# Patient Record
Sex: MALE | Race: BLACK OR AFRICAN AMERICAN | URBAN - METROPOLITAN AREA
[De-identification: names, ages, dates, MRNs, and addresses within clinical notes are randomized per-mention and may not be internally consistent; named-entity substitution may affect disease eponyms.]

---

## 2023-06-08 ENCOUNTER — OFFICE VISIT (OUTPATIENT)
Age: 78
End: 2023-06-08
Payer: MEDICARE

## 2023-06-08 VITALS
HEART RATE: 61 BPM | RESPIRATION RATE: 16 BRPM | TEMPERATURE: 98.4 F | SYSTOLIC BLOOD PRESSURE: 144 MMHG | BODY MASS INDEX: 27.09 KG/M2 | WEIGHT: 200 LBS | HEIGHT: 72 IN | OXYGEN SATURATION: 95 % | DIASTOLIC BLOOD PRESSURE: 84 MMHG

## 2023-06-08 DIAGNOSIS — I10 PRIMARY HYPERTENSION: Primary | ICD-10-CM

## 2023-06-08 DIAGNOSIS — N39.41 URGE INCONTINENCE OF URINE: ICD-10-CM

## 2023-06-08 DIAGNOSIS — G30.9 ALZHEIMER'S DEMENTIA, UNSPECIFIED DEMENTIA SEVERITY, UNSPECIFIED TIMING OF DEMENTIA ONSET, UNSPECIFIED WHETHER BEHAVIORAL, PSYCHOTIC, OR MOOD DISTURBANCE OR ANXIETY (HCC): ICD-10-CM

## 2023-06-08 DIAGNOSIS — F02.80 ALZHEIMER'S DEMENTIA, UNSPECIFIED DEMENTIA SEVERITY, UNSPECIFIED TIMING OF DEMENTIA ONSET, UNSPECIFIED WHETHER BEHAVIORAL, PSYCHOTIC, OR MOOD DISTURBANCE OR ANXIETY (HCC): ICD-10-CM

## 2023-06-08 PROCEDURE — 99213 OFFICE O/P EST LOW 20 MIN: CPT | Performed by: STUDENT IN AN ORGANIZED HEALTH CARE EDUCATION/TRAINING PROGRAM

## 2023-06-08 PROCEDURE — 3077F SYST BP >= 140 MM HG: CPT | Performed by: STUDENT IN AN ORGANIZED HEALTH CARE EDUCATION/TRAINING PROGRAM

## 2023-06-08 PROCEDURE — 1123F ACP DISCUSS/DSCN MKR DOCD: CPT | Performed by: STUDENT IN AN ORGANIZED HEALTH CARE EDUCATION/TRAINING PROGRAM

## 2023-06-08 PROCEDURE — 3078F DIAST BP <80 MM HG: CPT | Performed by: STUDENT IN AN ORGANIZED HEALTH CARE EDUCATION/TRAINING PROGRAM

## 2023-06-08 RX ORDER — LISINOPRIL 40 MG/1
40 TABLET ORAL
COMMUNITY
Start: 2022-11-07

## 2023-06-08 RX ORDER — UREA 200 MG/G
GEL TOPICAL
COMMUNITY
Start: 2023-05-08

## 2023-06-08 SDOH — ECONOMIC STABILITY: HOUSING INSECURITY: IN THE LAST 12 MONTHS, HOW MANY PLACES HAVE YOU LIVED?: 0

## 2023-06-08 SDOH — ECONOMIC STABILITY: HOUSING INSECURITY
IN THE LAST 12 MONTHS, WAS THERE A TIME WHEN YOU DID NOT HAVE A STEADY PLACE TO SLEEP OR SLEPT IN A SHELTER (INCLUDING NOW)?: NO

## 2023-06-08 SDOH — ECONOMIC STABILITY: FOOD INSECURITY: WITHIN THE PAST 12 MONTHS, YOU WORRIED THAT YOUR FOOD WOULD RUN OUT BEFORE YOU GOT MONEY TO BUY MORE.: NEVER TRUE

## 2023-06-08 SDOH — HEALTH STABILITY: PHYSICAL HEALTH: ON AVERAGE, HOW MANY DAYS PER WEEK DO YOU ENGAGE IN MODERATE TO STRENUOUS EXERCISE (LIKE A BRISK WALK)?: 3 DAYS

## 2023-06-08 SDOH — ECONOMIC STABILITY: INCOME INSECURITY: IN THE LAST 12 MONTHS, WAS THERE A TIME WHEN YOU WERE NOT ABLE TO PAY THE MORTGAGE OR RENT ON TIME?: NO

## 2023-06-08 SDOH — ECONOMIC STABILITY: TRANSPORTATION INSECURITY
IN THE PAST 12 MONTHS, HAS LACK OF TRANSPORTATION KEPT YOU FROM MEETINGS, WORK, OR FROM GETTING THINGS NEEDED FOR DAILY LIVING?: NO

## 2023-06-08 SDOH — HEALTH STABILITY: PHYSICAL HEALTH: ON AVERAGE, HOW MANY MINUTES DO YOU ENGAGE IN EXERCISE AT THIS LEVEL?: 10 MIN

## 2023-06-08 SDOH — ECONOMIC STABILITY: FOOD INSECURITY: WITHIN THE PAST 12 MONTHS, THE FOOD YOU BOUGHT JUST DIDN'T LAST AND YOU DIDN'T HAVE MONEY TO GET MORE.: NEVER TRUE

## 2023-06-08 SDOH — ECONOMIC STABILITY: INCOME INSECURITY: HOW HARD IS IT FOR YOU TO PAY FOR THE VERY BASICS LIKE FOOD, HOUSING, MEDICAL CARE, AND HEATING?: NOT HARD AT ALL

## 2023-06-08 SDOH — ECONOMIC STABILITY: TRANSPORTATION INSECURITY
IN THE PAST 12 MONTHS, HAS THE LACK OF TRANSPORTATION KEPT YOU FROM MEDICAL APPOINTMENTS OR FROM GETTING MEDICATIONS?: NO

## 2023-06-08 ASSESSMENT — SOCIAL DETERMINANTS OF HEALTH (SDOH)
HOW OFTEN DO YOU ATTEND CHURCH OR RELIGIOUS SERVICES?: 1 TO 4 TIMES PER YEAR
HOW OFTEN DO YOU GET TOGETHER WITH FRIENDS OR RELATIVES?: THREE TIMES A WEEK
DO YOU BELONG TO ANY CLUBS OR ORGANIZATIONS SUCH AS CHURCH GROUPS UNIONS, FRATERNAL OR ATHLETIC GROUPS, OR SCHOOL GROUPS?: YES
ARE YOU MARRIED, WIDOWED, DIVORCED, SEPARATED, NEVER MARRIED, OR LIVING WITH A PARTNER?: NEVER MARRIED
WITHIN THE LAST YEAR, HAVE YOU BEEN AFRAID OF YOUR PARTNER OR EX-PARTNER?: NO
WITHIN THE LAST YEAR, HAVE TO BEEN RAPED OR FORCED TO HAVE ANY KIND OF SEXUAL ACTIVITY BY YOUR PARTNER OR EX-PARTNER?: NO
IN A TYPICAL WEEK, HOW MANY TIMES DO YOU TALK ON THE PHONE WITH FAMILY, FRIENDS, OR NEIGHBORS?: THREE TIMES A WEEK
HOW OFTEN DO YOU ATTENT MEETINGS OF THE CLUB OR ORGANIZATION YOU BELONG TO?: 1 TO 4 TIMES PER YEAR
WITHIN THE LAST YEAR, HAVE YOU BEEN KICKED, HIT, SLAPPED, OR OTHERWISE PHYSICALLY HURT BY YOUR PARTNER OR EX-PARTNER?: NO
WITHIN THE LAST YEAR, HAVE YOU BEEN HUMILIATED OR EMOTIONALLY ABUSED IN OTHER WAYS BY YOUR PARTNER OR EX-PARTNER?: NO

## 2023-06-08 NOTE — PROGRESS NOTES
[address] [address] BRIE Luna. [place], [address]  [phone]    Chief Complaint: HTN    Subjective  Sofia Marin is a 78 y.o. Black / [place] American male , established patient, here for evaluation of the concern(s) above; Here with Niece Kulwant Castellanos). Per niece's report, patient still goes to [facility] for his yearly checkup but this is closer to his home. PMHx: Dementia (not on any meds), Overacting bladder and HTN. Pt does not offer much history but can hold a conversation. 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 They have addressed this before but not interested in medications due to side effects. Niece requesting adult diapers given frequency. He would sometimes be unable to hold his urine to get to the bathroom. Wakes up about 2 times to go to the bedroom. Niece states that she does not want     HTN - On Lisinopril 40mg daily. Pt denies any  fever, chill, chest pain, SOB, abdominal pain, n/v/d, HA or dizziness. Other Health Habits and social history:  Smoking history: Former smoker  Occupation: retired   Not . No kids. Lives with niece     Allergies - reviewed:   No Known Allergies    Past Medical History - reviewed:  Past Medical History:   Diagnosis Date    Hypertension        Depression screening:  No data recorded    Review of systems:   A comprehensive review of systems was negative except for that written in the History of Present Illness. Physical Exam  BP (!) 153/86 (Site: Right Upper Arm, Position: Sitting, Cuff Size: Medium Adult)   Pulse 61   Temp 98.4 °F (36.9 °C) (Oral)   Resp 16   Ht 5' 11.5\" (1.816 m)   Wt 200 lb (90.7 kg)   SpO2 95%   BMI 27.51 kg/m²     General: Alert and oriented, in no acute distress. LUNGS: Respirations unlabored; clear to auscultation bilaterally. CARDIOVASCULAR: Regular, rate, and rhythm without murmurs, gallops or rubs. EXT: No edema.

## 2023-06-08 NOTE — PROGRESS NOTES
Name and Date of Birth Verified    Accompanied by: Niece    Pharmacy Verified    Chief Complaint   Patient presents with    Follow-up     4/14/2023 Hypertension     Patient fasting , [facility]. 1. \"Have you been to the ER, urgent care clinic since your last visit? Hospitalized since your last visit? \" No    2. \"Have you seen or consulted any other health care providers outside of the [address] [place] [address] since your last visit? \" Yes [place] Nam 5/2023 Check Up     3. For patients aged 39-70: Has the patient had a colonoscopy / FIT/ Cologuard? Yes  2019    If the patient is female:    4. For patients aged 41-77: Has the patient had a mammogram within the past 2 years? N/A      5. For patients aged 21-65: Has the patient had a pap smear?  N/A     Health Maintenance Due   Topic Date Due    COVID-19 Vaccine (1) Never done    Hepatitis C screen  Never done    Lipids  Never done    Colorectal Cancer Screen  Never done    Shingles vaccine (1 of 2) Never done    DTaP/Tdap/Td vaccine (1 - Tdap) 09/02/2009    Pneumococcal 65+ years Vaccine (1 - PCV) 09/06/2012    AAA screen  Never done

## 2024-02-05 ENCOUNTER — OFFICE VISIT (OUTPATIENT)
Age: 79
End: 2024-02-05
Payer: MEDICARE

## 2024-02-05 VITALS
DIASTOLIC BLOOD PRESSURE: 77 MMHG | SYSTOLIC BLOOD PRESSURE: 144 MMHG | BODY MASS INDEX: 26.34 KG/M2 | HEART RATE: 55 BPM | RESPIRATION RATE: 16 BRPM | WEIGHT: 194.45 LBS | TEMPERATURE: 97.4 F | HEIGHT: 72 IN | OXYGEN SATURATION: 97 %

## 2024-02-05 DIAGNOSIS — M25.521 ARTHRALGIA OF BOTH ELBOWS: ICD-10-CM

## 2024-02-05 DIAGNOSIS — M25.522 ARTHRALGIA OF BOTH ELBOWS: ICD-10-CM

## 2024-02-05 DIAGNOSIS — I10 PRIMARY HYPERTENSION: Primary | ICD-10-CM

## 2024-02-05 PROCEDURE — 99213 OFFICE O/P EST LOW 20 MIN: CPT | Performed by: STUDENT IN AN ORGANIZED HEALTH CARE EDUCATION/TRAINING PROGRAM

## 2024-02-05 PROCEDURE — 3078F DIAST BP <80 MM HG: CPT | Performed by: STUDENT IN AN ORGANIZED HEALTH CARE EDUCATION/TRAINING PROGRAM

## 2024-02-05 PROCEDURE — 3077F SYST BP >= 140 MM HG: CPT | Performed by: STUDENT IN AN ORGANIZED HEALTH CARE EDUCATION/TRAINING PROGRAM

## 2024-02-05 PROCEDURE — 1123F ACP DISCUSS/DSCN MKR DOCD: CPT | Performed by: STUDENT IN AN ORGANIZED HEALTH CARE EDUCATION/TRAINING PROGRAM

## 2024-02-05 RX ORDER — LISINOPRIL 40 MG/1
40 TABLET ORAL DAILY
Qty: 90 TABLET | Refills: 3
Start: 2024-02-05

## 2024-02-05 ASSESSMENT — PATIENT HEALTH QUESTIONNAIRE - PHQ9
SUM OF ALL RESPONSES TO PHQ9 QUESTIONS 1 & 2: 0
SUM OF ALL RESPONSES TO PHQ QUESTIONS 1-9: 0
SUM OF ALL RESPONSES TO PHQ QUESTIONS 1-9: 0
2. FEELING DOWN, DEPRESSED OR HOPELESS: 0
SUM OF ALL RESPONSES TO PHQ QUESTIONS 1-9: 0
1. LITTLE INTEREST OR PLEASURE IN DOING THINGS: 0
SUM OF ALL RESPONSES TO PHQ QUESTIONS 1-9: 0

## 2024-02-05 NOTE — PROGRESS NOTES
Accompanied by: Niece     Chief Complaint   Patient presents with    Follow-up     2023 Hypertension     \"Have you been to the ER, urgent care clinic since your last visit?  Hospitalized since your last visit?\"    NO    “Have you seen or consulted any other health care providers outside of [facility] since your last visit?”      Yes  Gus Aviles  Provider  [employer] [facility] [employer]    Vitals:    24 0938   BP: (!) 144/77   Pulse: 55   Resp: 16   Temp: 97.4 °F (36.3 °C)   SpO2: 97%     Health Maintenance Due   Topic Date Due    Hepatitis C screen  Never done    Shingles vaccine (1 of 2) Never done    Respiratory Syncytial Virus (RSV) Pregnant or age 60 yrs+ (1 - 1-dose 60+ series) Never done    DTaP/Tdap/Td vaccine (1 - Tdap) 2009    Pneumococcal 65+ years Vaccine (1 - PCV) 2012    Annual Wellness Visit (Medicare Advantage)  Never done      The patient, Mika Mohamud, identity was verified by name and , pharmacy verified  Labs:Yes  Fasting:Yes

## 2024-02-05 NOTE — PROGRESS NOTES
[facility]  [address] [place] [address] [phone]    Chief Complaint: chronic medical problems    Subjective  Mika Mallory is a 78 y.o. Black /  male , established patient, here for evaluation of the concern(s) above;    Here with Niece (Yeny).    Per niece's report, patient still goes to [facility] for his yearly checkup but this is closer to his home.     PMHx: Dementia (not on any meds), Overacting bladder, Arthritis and HTN.    Pt does not offer much history but can hold a conversation.       HTN - On Lisinopril 40mg daily.    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 They have addressed this before but not interested in medications due to side effects.   Niece states that it have gotten better although he may have an accident every now and then.  We recommended adult diapers in the past but niece states that the company did not mail it to them and pt said he does not need them.    Pt denies any  fever, chill, chest pain, SOB, abdominal pain, n/v/d, HA or dizziness.      Other Health Habits and social history:  Smoking history: Former smoker  Occupation: retired   Not .  No kids.  Lives with niece     Allergies - reviewed:   No Known Allergies    Past Medical History - reviewed:  Past Medical History:   Diagnosis Date    Hypertension        Depression screening:  PHQ-9 Total Score: 0 (2/5/2024  9:37 AM)      Review of systems:   A comprehensive review of systems was negative except for that written in the History of Present Illness.     Physical Exam  BP (!) 144/77 (Site: Left Upper Arm, Position: Sitting, Cuff Size: Medium Adult)   Pulse 55   Temp 97.4 °F (36.3 °C) (Tympanic)   Resp 16   Ht 1.816 m (5' 11.5\")   Wt 88.2 kg (194 lb 7.1 oz)   SpO2 97%   BMI 26.74 kg/m²     General: Alert and oriented, in no acute distress.  LUNGS: Respirations unlabored; clear to auscultation

## 2024-10-30 ENCOUNTER — OFFICE VISIT (OUTPATIENT)
Age: 79
End: 2024-10-30

## 2024-10-30 VITALS
HEIGHT: 72 IN | HEART RATE: 59 BPM | TEMPERATURE: 97.4 F | BODY MASS INDEX: 25.68 KG/M2 | WEIGHT: 189.6 LBS | SYSTOLIC BLOOD PRESSURE: 152 MMHG | OXYGEN SATURATION: 96 % | DIASTOLIC BLOOD PRESSURE: 82 MMHG | RESPIRATION RATE: 16 BRPM

## 2024-10-30 DIAGNOSIS — Z00.00 MEDICARE ANNUAL WELLNESS VISIT, SUBSEQUENT: Primary | ICD-10-CM

## 2024-10-30 DIAGNOSIS — I10 HTN (HYPERTENSION), BENIGN: ICD-10-CM

## 2024-10-30 SDOH — ECONOMIC STABILITY: INCOME INSECURITY: HOW HARD IS IT FOR YOU TO PAY FOR THE VERY BASICS LIKE FOOD, HOUSING, MEDICAL CARE, AND HEATING?: NOT HARD AT ALL

## 2024-10-30 SDOH — ECONOMIC STABILITY: FOOD INSECURITY: WITHIN THE PAST 12 MONTHS, YOU WORRIED THAT YOUR FOOD WOULD RUN OUT BEFORE YOU GOT MONEY TO BUY MORE.: NEVER TRUE

## 2024-10-30 SDOH — ECONOMIC STABILITY: FOOD INSECURITY: WITHIN THE PAST 12 MONTHS, THE FOOD YOU BOUGHT JUST DIDN'T LAST AND YOU DIDN'T HAVE MONEY TO GET MORE.: NEVER TRUE

## 2024-10-30 ASSESSMENT — PATIENT HEALTH QUESTIONNAIRE - PHQ9
SUM OF ALL RESPONSES TO PHQ QUESTIONS 1-9: 0
SUM OF ALL RESPONSES TO PHQ9 QUESTIONS 1 & 2: 0
1. LITTLE INTEREST OR PLEASURE IN DOING THINGS: NOT AT ALL
SUM OF ALL RESPONSES TO PHQ QUESTIONS 1-9: 0
2. FEELING DOWN, DEPRESSED OR HOPELESS: NOT AT ALL
SUM OF ALL RESPONSES TO PHQ QUESTIONS 1-9: 0
SUM OF ALL RESPONSES TO PHQ QUESTIONS 1-9: 0

## 2024-10-30 ASSESSMENT — LIFESTYLE VARIABLES
HOW MANY STANDARD DRINKS CONTAINING ALCOHOL DO YOU HAVE ON A TYPICAL DAY: PATIENT DOES NOT DRINK
HOW OFTEN DO YOU HAVE A DRINK CONTAINING ALCOHOL: NEVER

## 2024-10-30 NOTE — PROGRESS NOTES
Chief Complaint   Patient presents with    Medicare AWV     Accompanied by: Niece     \"Have you been to the ER, urgent care clinic since your last visit?  Hospitalized since your last visit?\"    NO    “Have you seen or consulted any other health care providers outside of [facility] since your last visit?”    NO      There were no vitals filed for this visit.   Health Maintenance Due   Topic Date Due    Hepatitis C screen  Never done    Shingles vaccine (1 of 2) Never done    Respiratory Syncytial Virus (RSV) Pregnant or age 60 yrs+ (1 - 1-dose 60+ series) Never done    DTaP/Tdap/Td vaccine (1 - Tdap) 2009    Pneumococcal 65+ years Vaccine (1 of 1 - PCV) 2012    Annual Wellness Visit (Medicare Advantage)  Never done    Flu vaccine (1) 2024    COVID-19 Vaccine ( - - season) Never done        The patient, Mika Mallory, identity was verified by name and .

## 2024-10-30 NOTE — PROGRESS NOTES
[facility]  [address] [place] [address] [phone]    Chief Complaint: chronic medical problems    Subjective  Mika Mallory is a 79 y.o. Black /  male , established patient, here for evaluation of the concern(s) above;    Here with Niece (Yeny).    Per niece's report, patient still goes to [facility] for his yearly checkup but this is closer to his home.     PMHx: Dementia (not on any meds), Overacting bladder, Arthritis and HTN.    Pt does not offer much history but can hold a conversation.       HTN - Has not been taking his medications per his nice.  States that he is fine without them.  Was on Lisinopril 40mg daily.    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 They have addressed this before but not interested in medications due to side effects.   Niece states that it have gotten better although he may have an accident every now and then.  We recommended adult diapers.    Pt denies any  fever, chill, chest pain, SOB, abdominal pain, n/v/d, HA or dizziness.      Other Health Habits and social history:  Smoking history: Former smoker  Occupation: retired   Not .  No kids.  Lives with niece     Allergies - reviewed:   No Known Allergies    Past Medical History - reviewed:  Past Medical History:   Diagnosis Date    Hypertension        Depression screening:  PHQ-9 Total Score: 0 (10/30/2024  8:46 AM)      Review of systems:   A comprehensive review of systems was negative except for that written in the History of Present Illness.     Physical Exam  BP (!) 152/82 (Site: Right Upper Arm, Position: Sitting, Cuff Size: Small Adult)   Pulse 59   Temp 97.4 °F (36.3 °C) (Temporal)   Resp 16   Ht 1.816 m (5' 11.5\")   Wt 86 kg (189 lb 9.5 oz)   SpO2 96%   BMI 26.07 kg/m²     General: Alert and oriented, in no acute distress.  LUNGS: Respirations unlabored; clear to auscultation

## 2024-10-30 NOTE — PATIENT INSTRUCTIONS
home?  Keeping your body active can help slow MCI. Exercises like walking can help. Try to stay active mentally too. Read or do things like crossword puzzles if you enjoy doing them.  If you need help coping with MCI, you may want to get support from family, friends, a support group, or a counselor who works with people who have MCI.  Though the future isn't always clear, it can be good to plan ahead with instructions for your care. These are called advanced directives. Having a plan can help make sure that you get the care you want.  Current as of: December 20, 2023  Content Version: 14.2  © 2024 [employer].   Care instructions adapted under license by [employer]. If you have questions about a medical condition or this instruction, always ask your healthcare professional. [employer] disclaims any warranty or liability for your use of this information.           Learning About Being Active as an Older Adult  Why is being active important as you get older?     Being active is one of the best things you can do for your health. And it's never too late to start. Being active--or getting active, if you aren't already--has definite benefits. It can:  Give you more energy,  Keep your mind sharp.  Improve balance to reduce your risk of falls.  Help you manage chronic illness with fewer medicines.  No matter how old you are, how fit you are, or what health problems you have, there is a form of activity that will work for you. And the more physical activity you can do, the better your overall health will be.  What kinds of activity can help you stay healthy?  Being more active will make your daily activities easier. Physical activity includes planned exercise and things you do in daily life. There are four types of activity:  Aerobic.  Doing aerobic activity makes your heart and lungs strong.  Includes walking, dancing, and gardening.  Aim for at least 2½ hours spread throughout the week.  It

## 2024-10-31 ENCOUNTER — CLINICAL DOCUMENTATION (OUTPATIENT)
Dept: SPIRITUAL SERVICES | Age: 79
End: 2024-10-31

## 2024-10-31 NOTE — PROGRESS NOTES
Advance Care Planning   Ambulatory ACP Specialist Patient Outreach    Date:  10/31/2024    ACP Specialist:  Rayne Potter    Outreach call to patient in follow-up to ACP Specialist referral from:Reece Sandoval MD    [x] PCP  [] Provider   [] Ambulatory Care Management [] Other     For:                  [x] Advance Directive Assistance              [] Complete Portable DNR order              [] Complete POST/POLST/MOST              [x] Code Status Discussion             [x] Discuss Goals of Care             [] Early ACP Decision-Making              [] Other (Specify)    Date Referral Received:10/30/24    Next Step:   [x] ACP scheduled conversation  [] Outreach again in one week               [x] Email / Mail ACP Info Sheets  [x] Email / Mail Advance Directive   [] Closing referral.  Routing closure to referring provider/staff and to ACP Specialist .    [] Closure letter mailed to patient with invitation to contact ACP Specialist if / when ready.   [] Other (Specify here):       [x] At this time, Healthcare Decision Maker Is:         [] Primary agent named in scanned advance directive.    [] Legal Next of Kin.     [x] Unable to determine legal decision maker at this time.    Outreaches:         [x] 1st -  Date:  10/31/24               Intervention:  [] Spoke with Patient   [] Left Voice mail [] Email / Mail    [] MyChart  [x] Other (Specify) : Yeny Mallory    Outcomes:  Spoke with patient's niece Yeny Mallory on mobile phone number which is also listed as the home number scheduling a telephone conversation with ACP Specialist [place] on Monday, 11/4/24 at 8:00 a.m.              [] 2nd -  Date:                 Intervention:  [] Spoke with Patient  [] Left Voice mail [] Email / Mail    [] MyChart  [] Other (Specify) :              Outcomes:                [] 3rd -  Date:                Intervention:  [] Spoke with Patient   [] Left Voice mail [] Email / Mail    [] MyChart  [] Other (Specify)

## 2024-11-04 ENCOUNTER — CLINICAL DOCUMENTATION (OUTPATIENT)
Dept: CASE MANAGEMENT | Age: 79
End: 2024-11-04

## 2024-11-04 NOTE — ACP (ADVANCE CARE PLANNING)
Advance Care Planning     Advance Care Planning Clinical Specialist  Conversation Note      Date of ACP Conversation: 11/4/2024    Conversation Conducted with: Pt and his niece, Yeny Mallory    ACP Clinical Specialist: Mallorie Mills LCSW    Healthcare Decision Maker:     Current Designated Healthcare Decision Maker:     Legal next of kin    Care Preferences    Ventilation:  \"If you were in your present state of health and suddenly became very ill and were unable to breathe on your own, what would your preference be about the use of a ventilator (breathing machine) if it were available to you?\"      If the patient would desire the use of ventilator (breathing machine), answer \"yes\".  If not, \"no\": Health Care Preferences were not decided during this ACP appointment.      \"If your health worsens and it becomes clear that your chance of recovery is unlikely, what would your preference be about the use of a ventilator (breathing machine) if it were available to you?\"     Would the patient desire the use of ventilator (breathing machine)?: Health Care Preferences were not decided during this ACP appointment.      Resuscitation  \"CPR works best to restart the heart when there is a sudden event, like a heart attack, in someone who is otherwise healthy. Unfortunately, CPR does not typically restart the heart for people who have serious health conditions or who are very sick.\"    \"In the event your heart stopped as a result of an underlying serious health condition, would you want attempts to be made to restart your heart (answer \"yes\" for attempt to resuscitate) or would you prefer a natural death (answer \"no\" for do not attempt to resuscitate)?\" Health Care Preferences were not decided during this ACP appointment.       [] Yes   [x] No   Educated Patient / Decision Maker regarding differences between Advance Directives and portable DNR orders.    Length of ACP Conversation in minutes:  15    Conversation

## 2024-11-20 ENCOUNTER — CLINICAL DOCUMENTATION (OUTPATIENT)
Dept: CASE MANAGEMENT | Age: 79
End: 2024-11-20

## 2024-11-29 ENCOUNTER — CLINICAL DOCUMENTATION (OUTPATIENT)
Dept: CASE MANAGEMENT | Age: 79
End: 2024-11-29